# Patient Record
Sex: MALE | Race: OTHER
[De-identification: names, ages, dates, MRNs, and addresses within clinical notes are randomized per-mention and may not be internally consistent; named-entity substitution may affect disease eponyms.]

---

## 2021-06-27 ENCOUNTER — HOSPITAL ENCOUNTER (EMERGENCY)
Dept: HOSPITAL 8 - ED | Age: 31
Discharge: HOME | End: 2021-06-27
Payer: SELF-PAY

## 2021-06-27 VITALS — HEIGHT: 74 IN | WEIGHT: 229.72 LBS | BODY MASS INDEX: 29.48 KG/M2

## 2021-06-27 VITALS — DIASTOLIC BLOOD PRESSURE: 83 MMHG | SYSTOLIC BLOOD PRESSURE: 146 MMHG
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
## 2021-06-27 DIAGNOSIS — S91.115A: Primary | ICD-10-CM
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
## 2021-06-27 PROCEDURE — 99284 EMERGENCY DEPT VISIT MOD MDM: CPT

## 2021-06-27 PROCEDURE — 73130 X-RAY EXAM OF HAND: CPT

## 2021-06-27 PROCEDURE — 96372 THER/PROPH/DIAG INJ SC/IM: CPT

## 2021-06-27 PROCEDURE — 90715 TDAP VACCINE 7 YRS/> IM: CPT

## 2021-06-27 PROCEDURE — 90471 IMMUNIZATION ADMIN: CPT

## 2021-06-27 NOTE — NUR
This pt injured himself at his [employer] job, he works for himself and denies 
worker's compensation. Pt with 5th finger lac, at approx the distal tip of the 
nail bed. NAD.